# Patient Record
Sex: MALE | Race: WHITE | Employment: FULL TIME | URBAN - NONMETROPOLITAN AREA
[De-identification: names, ages, dates, MRNs, and addresses within clinical notes are randomized per-mention and may not be internally consistent; named-entity substitution may affect disease eponyms.]

---

## 2020-01-24 ENCOUNTER — HOSPITAL ENCOUNTER (OUTPATIENT)
Dept: GENERAL RADIOLOGY | Age: 65
Discharge: HOME | End: 2020-01-26
Payer: COMMERCIAL

## 2020-01-24 ENCOUNTER — OFFICE VISIT (OUTPATIENT)
Dept: ORTHOPEDIC SURGERY | Age: 65
End: 2020-01-24
Payer: COMMERCIAL

## 2020-01-24 VITALS
WEIGHT: 220 LBS | SYSTOLIC BLOOD PRESSURE: 110 MMHG | HEIGHT: 70 IN | BODY MASS INDEX: 31.5 KG/M2 | DIASTOLIC BLOOD PRESSURE: 74 MMHG | HEART RATE: 65 BPM

## 2020-01-24 PROBLEM — M54.12 CERVICAL RADICULAR PAIN: Status: ACTIVE | Noted: 2020-01-24

## 2020-01-24 PROBLEM — M25.512 CHRONIC LEFT SHOULDER PAIN: Status: ACTIVE | Noted: 2020-01-24

## 2020-01-24 PROBLEM — M54.2 CERVICAL SPINE PAIN: Status: ACTIVE | Noted: 2020-01-24

## 2020-01-24 PROBLEM — G89.29 CHRONIC LEFT SHOULDER PAIN: Status: ACTIVE | Noted: 2020-01-24

## 2020-01-24 PROCEDURE — 99203 OFFICE O/P NEW LOW 30 MIN: CPT | Performed by: ORTHOPAEDIC SURGERY

## 2020-01-24 PROCEDURE — 72040 X-RAY EXAM NECK SPINE 2-3 VW: CPT

## 2020-01-24 PROCEDURE — 73030 X-RAY EXAM OF SHOULDER: CPT

## 2020-01-24 RX ORDER — AMLODIPINE BESYLATE 5 MG/1
10 TABLET ORAL
COMMUNITY
Start: 2018-05-14

## 2020-01-24 RX ORDER — EZETIMIBE 10 MG/1
TABLET ORAL
COMMUNITY
Start: 2019-11-27

## 2020-01-24 RX ORDER — METOPROLOL SUCCINATE 100 MG/1
TABLET, EXTENDED RELEASE ORAL
COMMUNITY
Start: 2018-05-14

## 2020-01-24 NOTE — PROGRESS NOTES
Patient ID: Светлана Smith is a 60 y.o. male. Chief Complaint   Patient presents with    Neck Pain     c spine pain    Shoulder Pain     left shoulder pain        Neck Pain    This is a new problem. The current episode started more than 1 month ago. The problem occurs constantly. The problem has been gradually worsening. The pain is associated with nothing. The pain is present in the left side. The quality of the pain is described as aching and burning. The pain is moderate. The symptoms are aggravated by position. The pain is worse during the day. Associated symptoms include numbness and tingling. He has tried chiropractic manipulation and home exercises for the symptoms. The treatment provided no relief. Shoulder Pain    Associated symptoms include numbness and tingling. Patient presents with neck left radicular arm pain. Patient reports back in August he developed predominately periscapular pain with some neck pain. Later in the fall he started to develop pain dysesthesia predominately into the ulnar aspect of his hand initially described as his entire hand although on provocative testing today it predominantly affected the ulnar side    Patient is a     He has been doing home stretching and exercises really since this started    Patient has subsequently tried 2 months of chiropractic care which will give him transient relief generally but then recurrence        Past Medical History:   Diagnosis Date    DM (diabetes mellitus) ([address]     HTN (hypertension)     Hypercholesteremia      Past Surgical History:   Procedure Laterality Date    KNEE ARTHROSCOPY Left     UMBILICAL HERNIA REPAIR       History reviewed. No pertinent family history.   Social History     Occupational History    Not on file   Tobacco Use    Smoking status: Never Smoker    Smokeless tobacco: Never Used   Substance and Sexual Activity    Alcohol use: Yes     Comment: social    Drug use: Never    Sexual activity: Not on file        Review of Systems   Musculoskeletal: Positive for neck pain. Neurological: Positive for tingling and numbness. All other systems reviewed and are negative. Physical Exam  Vitals signs and nursing note reviewed. Constitutional:       Appearance: He is well-developed. HENT:      Head: Normocephalic and atraumatic. Nose: Nose normal.   Eyes:      Conjunctiva/sclera: Conjunctivae normal.   Neck:      Musculoskeletal: Normal range of motion and neck supple. Pulmonary:      Effort: Pulmonary effort is normal. No respiratory distress. Musculoskeletal:      Comments: Normal gait     Skin:     General: Skin is warm and dry. Neurological:      Mental Status: He is alert and oriented to person, place, and time. Sensory: No sensory deficit. Psychiatric:         Behavior: Behavior normal.         Thought Content: Thought content normal.     Examination neck reveals full range of motion    Neck extension with rotation to left aggravates symptoms; neck extension with rotation to right feels good; extension alone tends to aggravate his pain; post examining neck patient more accurately describe the dysesthesia radiating to his left hand is been to the ulnar aspect    Upper extremity strength is 5/5  Mild impingement signs nl shoulder range of motion      X-rays neck 5667 disc space collapse age-appropriate    X-ray shoulder normal for age mild degenerative changes particularly inferior humeral head margin  Assessment:        Encounter Diagnoses   Name Primary?  Cervical spine pain Yes    Chronic left shoulder pain     Cervical radicular pain      Progressive radicular arm pain over the last 4 months guarding 6 months ago with predominately periscapular pain and neck pain    Failure of home stretches exercises chiropractic care    1. Cervical spine pain    2.  Chronic left shoulder pain        Plan:     MRI cervical    No orders of the defined types were placed in this encounter. Ileana Verde MD    Please note that this chart was generated using voicerecognition Dragon dictation software. Although every effort was made to ensurethe accuracy of this automated transcription, some errors in transcription may haveoccurred.

## 2020-01-30 ENCOUNTER — HOSPITAL ENCOUNTER (OUTPATIENT)
Dept: MRI IMAGING | Age: 65
Discharge: HOME | End: 2020-02-01
Payer: COMMERCIAL

## 2020-01-30 PROCEDURE — 72141 MRI NECK SPINE W/O DYE: CPT

## 2020-02-07 ENCOUNTER — OFFICE VISIT (OUTPATIENT)
Dept: ORTHOPEDIC SURGERY | Age: 65
End: 2020-02-07
Payer: COMMERCIAL

## 2020-02-07 VITALS
DIASTOLIC BLOOD PRESSURE: 78 MMHG | HEIGHT: 70 IN | HEART RATE: 82 BPM | WEIGHT: 220 LBS | SYSTOLIC BLOOD PRESSURE: 128 MMHG | BODY MASS INDEX: 31.5 KG/M2

## 2020-02-07 PROBLEM — M50.20 CERVICAL DISC HERNIATION: Status: ACTIVE | Noted: 2020-02-07

## 2020-02-07 PROBLEM — M48.02 SPINAL STENOSIS IN CERVICAL REGION: Status: ACTIVE | Noted: 2020-02-07

## 2020-02-07 PROCEDURE — 99213 OFFICE O/P EST LOW 20 MIN: CPT | Performed by: ORTHOPAEDIC SURGERY
